# Patient Record
Sex: FEMALE | Race: WHITE | NOT HISPANIC OR LATINO
[De-identification: names, ages, dates, MRNs, and addresses within clinical notes are randomized per-mention and may not be internally consistent; named-entity substitution may affect disease eponyms.]

---

## 2019-02-28 ENCOUNTER — RECORDS - HEALTHEAST (OUTPATIENT)
Dept: ADMINISTRATIVE | Facility: OTHER | Age: 34
End: 2019-02-28

## 2019-03-18 ENCOUNTER — RECORDS - HEALTHEAST (OUTPATIENT)
Dept: ADMINISTRATIVE | Facility: OTHER | Age: 34
End: 2019-03-18

## 2019-03-19 ENCOUNTER — COMMUNICATION - HEALTHEAST (OUTPATIENT)
Dept: ADMINISTRATIVE | Facility: CLINIC | Age: 34
End: 2019-03-19

## 2019-03-26 ENCOUNTER — AMBULATORY - HEALTHEAST (OUTPATIENT)
Dept: EDUCATION SERVICES | Facility: CLINIC | Age: 34
End: 2019-03-26

## 2019-03-27 ENCOUNTER — COMMUNICATION - HEALTHEAST (OUTPATIENT)
Dept: EDUCATION SERVICES | Facility: CLINIC | Age: 34
End: 2019-03-27

## 2019-03-27 ENCOUNTER — AMBULATORY - HEALTHEAST (OUTPATIENT)
Dept: EDUCATION SERVICES | Facility: CLINIC | Age: 34
End: 2019-03-27

## 2019-03-27 DIAGNOSIS — O24.414 INSULIN CONTROLLED GESTATIONAL DIABETES MELLITUS (GDM) IN THIRD TRIMESTER: ICD-10-CM

## 2019-04-11 ENCOUNTER — OFFICE VISIT - HEALTHEAST (OUTPATIENT)
Dept: ENDOCRINOLOGY | Facility: CLINIC | Age: 34
End: 2019-04-11

## 2019-04-11 ENCOUNTER — OFFICE VISIT - HEALTHEAST (OUTPATIENT)
Dept: EDUCATION SERVICES | Facility: CLINIC | Age: 34
End: 2019-04-11

## 2019-04-11 DIAGNOSIS — O24.414 INSULIN CONTROLLED GESTATIONAL DIABETES MELLITUS (GDM) IN THIRD TRIMESTER: ICD-10-CM

## 2019-04-11 RX ORDER — GLUCOSAMINE HCL/CHONDROITIN SU 500-400 MG
4 CAPSULE ORAL DAILY
Status: SHIPPED | COMMUNITY
Start: 2019-04-11

## 2019-04-11 ASSESSMENT — MIFFLIN-ST. JEOR: SCORE: 1833.52

## 2019-04-26 ENCOUNTER — COMMUNICATION - HEALTHEAST (OUTPATIENT)
Dept: ADMINISTRATIVE | Facility: CLINIC | Age: 34
End: 2019-04-26

## 2019-05-01 ENCOUNTER — OFFICE VISIT - HEALTHEAST (OUTPATIENT)
Dept: ENDOCRINOLOGY | Facility: CLINIC | Age: 34
End: 2019-05-01

## 2019-05-01 DIAGNOSIS — O24.414 INSULIN CONTROLLED GESTATIONAL DIABETES MELLITUS (GDM) IN THIRD TRIMESTER: ICD-10-CM

## 2019-05-01 ASSESSMENT — MIFFLIN-ST. JEOR: SCORE: 1827.62

## 2021-05-27 NOTE — PROGRESS NOTES
[facility] GDM Care Plan    Assessment: pt seen today for initial visit. Referral states pt has been checking BG already, fasting and 1 hour pp. She has seen the dietician and has been taught GDM meal plan/diet. Pt is persistently having FBG elevations per referral.   Today, she brings in BG and food log. FB, 99, 96, 98, 98, 98, 101, 100  Pp readings are in good control. She has a couple very occasional elevations that are explainable. She was not instructed to check ketones.   Called insurance company to see what insulin is covered. Unfortunately NPH and levemir are a Tier 3. Lantus is the preferred insulin and for cost reasons this is the best option as we can also get a $0 lantus co-pay card. Therefore will start with 8 units of lantus at the same time at HS. Pt was able to demo pen w/o difficulty. Discussed that her insulin will last 24 hours effecting all her BG, not just fasting. Reviewed signs and symptoms of hypoglycemia and treatment. She is to carry treatment. She is not going long periods w/o eating, not missing any meals or snacks per food log.   Instructed pt to increase insulin by + 2 units every 2 FBG >95 and to -2  Units if she has any low BG or s/s of low BG. Pt verbalized understanding and this was all written down for her.   Pt is not able to f/u next week. She will f/u in 2 weeks on 19. Pt advised to call with any concerns prior to f/u.       Visit Type: GDM Individual Follow-up  Time: 30  Visit #: 1  Provider: Joana Virk  Provider's Diagnosis (per referral form): Gestational (648.83)  Weight:    OGTT: No results found for this or any previous visit.   Estimated Date of Delivery:  19  Pregnancy #: 4  Previous GDM: No     BG monitoring goals: Fasting <95; 1 hour post start of meal <140. Test 4 x per day.  Check fasting a.m. ketones: No  GDM meal pattern/carb counting taught per guidelines: Yes    New Goals:  Nutrition: GDM meal plan   Activity: Walking after meals when  able/staying active   Monitoring: BG 4x/day as directed, ketones every morning    DIABETES CARE PLAN AND EDUCATION RECORD    Gestational Diabetes Disease Process/Preconception Care/Management During Pregnancy/Postpartum:Discussed    Meter (per above goals): Discussed    Nutrition Management    Weight: Assessed and Discussed  Portions/Balance: Assessed and Discussed  Carb ID/Count: Assessed and Discussed  Label Reading: Assessed and Discussed  Menu Planning: Assessed and Discussed  Dining Out: Assessed and Discussed  Physical Activity: Assessed and Discussed    Acute Complications: Prevent, Detect, Treat:    Goal Setting and Problem Solving: Assessed and Discussed  Barriers: Assessed and Discussed  Psychosocial Adjustments: Assessed and Discussed    I agree with the aforementioned diabetes plan.  Jonelle PATEL [facility] Endocrinology  3/27/2019  3:53 PM

## 2021-05-27 NOTE — PROGRESS NOTES
"Wt Readings from Last 3 Encounters:   04/11/19 (!) 245 lb 1.6 oz (111.2 kg)     BP Readings from Last 3 Encounters:   04/11/19 120/74     [facility] GDM Care Plan      Assessment/Plan: Elsa is here alone today for her follow-up on gestational diabetes.  She is currently taking Lantus 20 units at bedtime.  She has noticed that her fasting blood sugars tend to be higher when she is active in the evening hours.  Questions whether she should continue to do this.  Reviewed what she is having for her bedtime snack.  She is currently having a hard boiled egg and a glass of fair life skim milk.  When she is not active her fasting blood sugars have been 93/93/97.  When she is active and taking 20 units her fasting blood sugars have been 99 and 116.  Advised to add more carbohydrates to her bedtime snack on evenings that she is active.  Also increase Lantus to 22 units starting tonight.  Continue to increase by 2 units every 2 days her fasting blood sugar is above goal.    All additional questions and concerns addressed.  She will follow-up with endocrinology in 3 weeks.  Calling if she continues to increase her insulin and her fasting blood sugars are not meeting goal.        Time: 30 minutes  Visit Type: pregnancy clinic   Provider: Joyce Hatch  Provider's Diagnosis (per referral form): Gestational (648.83)    Weight:    OGTT: 84/192/158/--  Estimated Date of Delivery: 5.22.19  Pregnancy #: 4  Previous GDM: No   Medications:   Current Outpatient Medications:      blood glucose test strips, Use 4 each As Directed daily. Dispense brand per patient's insurance at pharmacy discretion., Disp: , Rfl:      insulin glargine (LANTUS SOLOSTAR U-100 INSULIN) 100 unit/mL (3 mL) pen, Inject 8 Units under the skin at bedtime. (Patient taking differently: Inject 20 Units under the skin at bedtime.    ), Disp: 15 mL, Rfl: 0     pen needle, diabetic (BD ULTRA-FINE BROOKLYN PEN NEEDLE) 32 gauge x 5/32\" Ndle, Use 1 each As Directed daily., Disp: 100 " each, Rfl: 0      BG monitoring goals: Fasting <95; 1 hour post start of meal <140. Test 4 x per day.  Check fasting a.m. ketones: Yes  GDM meal pattern/carb counting taught per guidelines: Yes    Past Goals:  Nutrition: GDM meal plan -Met  Activity: Walking after meals when able/staying active -Met  Monitoring: BG 4x/day as directed, ketones every morning -Met      New Goals:  Nutrition: GDM meal plan   Activity: Walking after meals when able/staying active   Monitoring: BG 4x/day as directed, ketones every morning    DIABETES CARE PLAN AND EDUCATION RECORD    Gestational Diabetes Disease Process/Preconception Care/Management During Pregnancy/Postpartum:Discussed    Meter (per above goals): Assessed and Discussed    Nutrition Management    Weight: Assessed and Discussed  Portions/Balance: Assessed and Discussed  Carb ID/Count: Assessed and Discussed  Label Reading: Assessed and Discussed  Menu Planning: Assessed and Discussed  Dining Out: Assessed and Discussed  Physical Activity: Assessed and Discussed    Acute Complications: Prevent, Detect, Treat:    Goal Setting and Problem Solving: Assessed and Discussed  Barriers: Assessed and Discussed  Psychosocial Adjustments: Assessed and Discussed    I agree with the aforementioned diabetes plan.  Jonelle PATEL [facility] Endocrinology  4/12/2019  12:36 PM

## 2021-05-27 NOTE — TELEPHONE ENCOUNTER
Date: 4/11/2019 Status: [facility]   Time: 2:40 PM Length: 20   Visit Type: DE/ENDO OFFICE VISIT [[id number]] Copay: $0.00   Provider: Ximena Green Diabetes Ed Department: [facility] DIABETES EDUCATION

## 2021-05-27 NOTE — PROGRESS NOTES
[facility]  ENDOCRINOLOGY    Gestational Diabetes 2019    Elsa Spaulding, 1985, [id number]          Reason for visit      1. Insulin controlled gestational diabetes mellitus (GDM) in third trimester        HPI     Elsa Spaulding is a very pleasant 33 y.o. old female who presents for GESTATIONAL Diabetes Mellitus.  She is currently 34w  pregnant . Due date is 19  Diagnosed with GDM based on an OGTT. She hasnot had  GDM in prior pregnancies.   Current carbohydrate intake:consistent with recommendations of 30g-60g-60g.  I have reviewed her blood glucose logs and note that the:  Fasting readings  are:in range on current regimen  Postprandial readings are:in range on current regimen  Current Lantus dose: 20  Current Prandial insulin: 0  Blood glucose logs/meter brought in and data reviewed and incorporated into decision-making.  Planned delivery at: [place]   ARACELI: [place]    Therapy/Interventions in the past:  She has been seen by the Diabetes Educator- and has received instruction on carbohydrate counting and  consistency.  Records from referring provider and other sources have also been reviewed and incorporated into decision-making.      TODAY:  Elsa is here today for the first time after starting insulin.  She is up to 20 units of Lantus at HS.  She is a little frustrated because when she walks after dinner, her FBS tend to be higher, and out of range, when she doesn't, they are lower.  She is eating a high protein snack at HS and is not eating any CHO at that time. We will have her increase her dosage to 22 units, and have some CHO with snack when she is more active.  PP readings look good.  Baby is was last measured in the 70th percentile two weeks ago.  Baby is moving appropriately and she is having no swelling.  Baby is passing NSTs and BPPs and she is having no swelling.   has no current concerns.      Past Medical History       Patient Active Problem List   Diagnosis     CHI  "(closed head injury)     Depression with anxiety     Elevated LFTs     Hirsutism     Hypomagnesemia     Insulin resistance syndrome     Migraine without aura and without status migrainosus, not intractable     Mild concussion     Obesity, Class II, BMI 35-39.9     Postpartum thyroiditis     Umbilical hernia     Vitamin D deficiency     Insulin controlled gestational diabetes mellitus (GDM) in third trimester        Past Surgical History     History reviewed. No pertinent surgical history.    Family History     History reviewed. No pertinent family history.    Social History     Social History     Tobacco Use     Smoking status: Never Smoker     Smokeless tobacco: Never Used   Substance Use Topics     Alcohol use: Not Currently     Drug use: Never       Review of Systems     Patient has no polyuria or polydipsia, no chest pain, dyspnea or TIA's, no numbness, tingling or pain in extremities  Remainder negative except as noted in HPI.      Vital Signs     /74   Pulse 92   Ht 5' 6\" (1.676 m)   Wt (!) 245 lb 1.6 oz (111.2 kg)   BMI 39.56 kg/m    Wt Readings from Last 3 Encounters:   04/11/19 (!) 245 lb 1.6 oz (111.2 kg)       Physical Exam     GENERAL: Pleasant, alert, appropriate appearance for age. No acute distress,   HEENT: Normocephalic, atraumatic  NECK: Supple, no masses or  lymph nodes.  THYROID: No nodules or enlargement. Non-tender, no bruit  CHEST/RESPIRATORY: Normal chest wall and respirations. Clear to auscultation.  CARDIOVASCULAR: Regular rate and rhythm. S1, S2, no murmur, click, gallop, or rubs.  ABDOMEN: Gravid   LYMPHATIC: No palpable nodes in neck, supraclavicular,  SKIN: No melanosis,  ecchymosis,  vitiligo. No acanthosis nigricans  NEURO:  Non-focal, normal DTRs; no tremor.  PSYCH: Alert and oriented -appropriate affect. Orientation, judgement and memory appear intact.  MSK: No joint abnormalities, FROM in all four extremities. No kyphosis    Assessment     1. Insulin controlled " "gestational diabetes mellitus (GDM) in third trimester        Plan     1. GESTATIONAL DIABETES-  Adjust dose as follows:    -Lantus [id number]   units. Increase by 2 units every 2 days to keep fasting blood glucose below 95mg/dL  -Novolog 0  units with breakfast  -Novolog 0 units with lunch   -Novolog 0 units with dinner  -Increase by 2 units every 2 days to keep 1 hour after meal blood glucose less than 140mg/dL    We reviewed glucose goals of fasting blood glucose <95 mg/dL and 1 hour post prandial blood glucose of <140 mg/dL.    Monitor blood sugar 4 times daily: Fasting  and 1 hour after each meal.  Contact  this clinic [phone] if blood glucose is not within the above-mentioned goals.     We discussed the importance of excellent glycemic control during pregnancy to limit complications such as fetal macrosomia, shoulder dystocia,  hypoglycemia and hyperbilirubinemia.  I have discussed the patient's increased risk of recurrent GDM and/or development of type 2 diabetes later in life.      She will f/u with us in 2 weeks for her last appointment prior to delivery. Time spent with pt today: 25 min with >50% spent in counseling and coordination of care.          Jonelle Finley  2019      Lab Results     No results found for: HGBA1C, CREATININE, MICROALBUR    No results found for: CHOL, HDL, LDLCALC, TRIG    No results found for: ALT, AST, GGT, ALKPHOS, BILITOT      Current Medications     Outpatient Medications Prior to Visit   Medication Sig Dispense Refill     blood glucose test strips Use 4 each As Directed daily. Dispense brand per patient's insurance at pharmacy discretion.       insulin glargine (LANTUS SOLOSTAR U-100 INSULIN) 100 unit/mL (3 mL) pen Inject 8 Units under the skin at bedtime. (Patient taking differently: Inject 20 Units under the skin at bedtime.       ) 15 mL 0     pen needle, diabetic (BD ULTRA-FINE BROOK[place]N PEN NEEDLE) 32 gauge x \" Ndle Use 1 each As Directed daily. 100 each " 0     No facility-administered medications prior to visit.

## 2021-05-27 NOTE — TELEPHONE ENCOUNTER
Date: 4/11/2019 Status: [facility]   Time: 2:20 PM Length: 20   Visit Type: DE/ENDO OFFICE VISIT [[id number]] Copay: $0.00   Provider: Jonelle Finley NP Department: [facility] ENDOCRINOLOGY

## 2021-05-28 NOTE — PROGRESS NOTES
[facility]  ENDOCRINOLOGY    Gestational Diabetes 2019    Elsa Spaulding, 1985, [id number]          Reason for visit      1. Insulin controlled gestational diabetes mellitus (GDM) in third trimester        HPI     Elsa Spaulding is a very pleasant 33 y.o. old female who presents for GESTATIONAL Diabetes Mellitus.  She is currently 37w  pregnant . Due date is 19  Diagnosed with GDM based on an OGTT. She hasnot had  GDM in prior pregnancies.   Current carbohydrate intake:consistent with recommendations of 30g-60g-60g.  I have reviewed her blood glucose logs and note that the:  Fasting readings  are:in range on current regimen  Postprandial readings are:in range on current regimen  Current Lantus dose: 34  Current Prandial insulin: 0  Blood glucose logs/meter brought in and data reviewed and incorporated into decision-making.  Planned delivery at: [place]        ARACELI: [place]      Therapy/Interventions in the past:  She has been seen by the Diabetes Educator- and has received instruction on carbohydrate counting and  consistency.  Records from referring provider and other sources have also been reviewed and incorporated into decision-making.      TODAY:  Elsa is here today for her last appointment prior to delivery.  She brings her log book and she is still having some difficulty managing her FBS.  She has been titrating up by two and just needs a bigger boost. We will have her increase by 4s. She intended to go up to 36 tonight, and will do so.  Baby is passing BPPs and NSTs with no problems.  She is having some mild swelling at present that resolves overnight.  She reports that baby is moving.  Postpartum instructions given today and all questions answered to her satisfaction.     Past Medical History       Patient Active Problem List   Diagnosis     CHI (closed head injury)     Depression with anxiety     Elevated LFTs     Hirsutism     Hypomagnesemia     Insulin resistance syndrome      "Migraine without aura and without status migrainosus, not intractable     Mild concussion     Obesity, Class II, BMI 35-39.9     Postpartum thyroiditis     Umbilical hernia     Vitamin D deficiency     Insulin controlled gestational diabetes mellitus (GDM) in third trimester        Past Surgical History     History reviewed. No pertinent surgical history.    Family History     History reviewed. No pertinent family history.    Social History     Social History     Tobacco Use     Smoking status: Never Smoker     Smokeless tobacco: Never Used   Substance Use Topics     Alcohol use: Not Currently     Drug use: Never       Review of Systems     Patient has no polyuria or polydipsia, no chest pain, dyspnea or TIA's, no numbness, tingling or pain in extremities  Remainder negative except as noted in HPI.      Vital Signs     /64   Pulse 84   Ht 5' 6\" (1.676 m)   Wt (!) 243 lb 12.8 oz (110.6 kg)   LMP  (LMP Unknown)   BMI 39.35 kg/m    Wt Readings from Last 3 Encounters:   05/01/19 (!) 243 lb 12.8 oz (110.6 kg)   04/11/19 (!) 245 lb 1.6 oz (111.2 kg)       Physical Exam     GENERAL: Pleasant, alert, appropriate appearance for age. No acute distress,   HEENT: Normocephalic, atraumatic  NECK: Supple, no masses or  lymph nodes.  THYROID: No nodules or enlargement. Non-tender, no bruit  CHEST/RESPIRATORY: Normal chest wall and respirations. Clear to auscultation.  CARDIOVASCULAR: Regular rate and rhythm. S1, S2, no murmur, click, gallop, or rubs.  ABDOMEN: Gravid   LYMPHATIC: No palpable nodes in neck, supraclavicular,  SKIN: No melanosis,  ecchymosis,  vitiligo. No acanthosis nigricans  NEURO:  Non-focal, normal DTRs; no tremor.  PSYCH: Alert and oriented -appropriate affect. Orientation, judgement and memory appear intact.  MSK: No joint abnormalities, FROM in all four extremities. No kyphosis    Assessment     1. Insulin controlled gestational diabetes mellitus (GDM) in third trimester        Plan     1. " GESTATIONAL DIABETES-  Adjust dose as follows:    -Lantus [id number]   units. Increase by 2 units every 2 days to keep fasting blood glucose below 95mg/dL  -Novolog 0  units with breakfast  -Novolog 0 units with lunch   -Novolog 0 units with dinner  -Increase by 0 units every 2 days to keep 1 hour after meal blood glucose less than 140mg/dL    We reviewed glucose goals of fasting blood glucose <95 mg/dL and 1 hour post prandial blood glucose of <140 mg/dL.    Monitor blood sugar 4 times daily: Fasting  and 1 hour after each meal.  Contact  this clinic [phone] if blood glucose is not within the above-mentioned goals.     We discussed the importance of excellent glycemic control during pregnancy to limit complications such as fetal macrosomia, shoulder dystocia,  hypoglycemia and hyperbilirubinemia.  I have discussed the patient's increased risk of recurrent GDM and/or development of type 2 diabetes later in life.        F/up with A1c 3 months postpartum with PCP.    May be a candidate for metformin postpartum as she has more than 1 risk factor for future Type 2 Diabetes Mellitus.    She will need a screening A1c at least annually, TLC- including carbohydrate control and exercise.    After you deliver the baby, it is suggested to check your blood sugar occasionally (1 - 2 times per week) for 6 weeks.     When you are not pregnant, normal blood sugars are:       Fasting (before eating anything in the morning)  - under 100 mg/dl    2 hours after meals under 140  The American Diabetes Association recommends:     a glucose tolerance test 6 weeks after you deliver the baby.         a hemoglobin Alc test yearly after delivery.  The Alc test is a 2-3 month average blood sugar reading.        Discuss getting these tests with your provider.       After delivery, and your provider has said that it is safe to be active, work toward getting 150 minutes each week of physical activity to decrease your risk of  getting  "type 2 diabetes.       Maintaining a healthy body weight will also decrease your risk of getting type 2 diabetes.           Jonelle PATEL [facility]  5/8/2019      Lab Results     No results found for: HGBA1C, CREATININE, MICROALBUR    No results found for: CHOL, HDL, LDLCALC, TRIG    No results found for: ALT, AST, GGT, ALKPHOS, BILITOT      Current Medications     Outpatient Medications Prior to Visit   Medication Sig Dispense Refill     blood glucose test strips Use 4 each As Directed daily. Dispense brand per patient's insurance at pharmacy discretion.       insulin glargine (LANTUS SOLOSTAR U-100 INSULIN) 100 unit/mL (3 mL) pen Inject 8 Units under the skin at bedtime. (Patient taking differently: Inject 34 Units under the skin at bedtime.       ) 15 mL 0     pen needle, diabetic (BD ULTRA-FINE BROOK[place]N PEN NEEDLE) 32 gauge x 5/32\" Ndle Use 1 each As Directed daily. 100 each 0     No facility-administered medications prior to visit.        "

## 2021-06-02 VITALS — HEIGHT: 66 IN | BODY MASS INDEX: 39.39 KG/M2 | WEIGHT: 245.1 LBS

## 2021-06-02 VITALS — BODY MASS INDEX: 39.18 KG/M2 | WEIGHT: 243.8 LBS | HEIGHT: 66 IN

## 2021-06-16 PROBLEM — O24.414 INSULIN CONTROLLED GESTATIONAL DIABETES MELLITUS (GDM) IN THIRD TRIMESTER: Status: ACTIVE | Noted: 2019-04-13

## 2021-06-25 NOTE — TELEPHONE ENCOUNTER
[facility]   Referring Provider: Dr. Hatch  DX: Gestational Diabetes     Ref./rec. Were received in DM consult folder 03.18.2019 @ 3:54 pm.

## 2023-04-24 ENCOUNTER — LAB REQUISITION (OUTPATIENT)
Dept: LAB | Facility: CLINIC | Age: 38
End: 2023-04-24

## 2023-04-24 DIAGNOSIS — Z11.3 ENCOUNTER FOR SCREENING FOR INFECTIONS WITH A PREDOMINANTLY SEXUAL MODE OF TRANSMISSION: ICD-10-CM

## 2023-04-24 PROCEDURE — 87591 N.GONORRHOEAE DNA AMP PROB: CPT | Performed by: NURSE PRACTITIONER

## 2023-04-24 PROCEDURE — 87389 HIV-1 AG W/HIV-1&-2 AB AG IA: CPT | Performed by: NURSE PRACTITIONER

## 2023-04-24 PROCEDURE — 86803 HEPATITIS C AB TEST: CPT | Performed by: NURSE PRACTITIONER

## 2023-04-24 PROCEDURE — 86780 TREPONEMA PALLIDUM: CPT | Performed by: NURSE PRACTITIONER

## 2023-04-24 PROCEDURE — 87491 CHLMYD TRACH DNA AMP PROBE: CPT | Performed by: NURSE PRACTITIONER

## 2023-04-26 LAB
C TRACH DNA SPEC QL PROBE+SIG AMP: NEGATIVE
HCV AB SERPL QL IA: NONREACTIVE
HIV 1+2 AB+HIV1 P24 AG SERPL QL IA: NONREACTIVE
N GONORRHOEA DNA SPEC QL NAA+PROBE: NEGATIVE
T PALLIDUM AB SER QL: NONREACTIVE

## 2024-05-13 ENCOUNTER — LAB REQUISITION (OUTPATIENT)
Dept: LAB | Facility: CLINIC | Age: 39
End: 2024-05-13
Payer: COMMERCIAL

## 2024-05-13 DIAGNOSIS — Z11.3 ENCOUNTER FOR SCREENING FOR INFECTIONS WITH A PREDOMINANTLY SEXUAL MODE OF TRANSMISSION: ICD-10-CM

## 2024-05-13 PROCEDURE — 86803 HEPATITIS C AB TEST: CPT | Mod: ORL | Performed by: OBSTETRICS & GYNECOLOGY

## 2024-05-13 PROCEDURE — 87389 HIV-1 AG W/HIV-1&-2 AB AG IA: CPT | Mod: ORL | Performed by: OBSTETRICS & GYNECOLOGY

## 2024-05-13 PROCEDURE — 87340 HEPATITIS B SURFACE AG IA: CPT | Mod: ORL | Performed by: OBSTETRICS & GYNECOLOGY

## 2024-05-13 PROCEDURE — 86592 SYPHILIS TEST NON-TREP QUAL: CPT | Mod: ORL | Performed by: OBSTETRICS & GYNECOLOGY

## 2024-05-14 LAB — HIV 1+2 AB+HIV1 P24 AG SERPL QL IA: NONREACTIVE

## 2024-05-15 LAB
HBV SURFACE AG SERPL QL IA: NONREACTIVE
HCV AB SERPL QL IA: NONREACTIVE
RPR SER QL: NONREACTIVE